# Patient Record
Sex: FEMALE | Race: WHITE | URBAN - METROPOLITAN AREA
[De-identification: names, ages, dates, MRNs, and addresses within clinical notes are randomized per-mention and may not be internally consistent; named-entity substitution may affect disease eponyms.]

---

## 2018-02-15 ENCOUNTER — HOSPITAL ENCOUNTER (OUTPATIENT)
Dept: SURGERY | Age: 43
Discharge: HOME | End: 2018-02-15
Attending: SURGERY | Admitting: SURGERY

## 2018-02-15 VITALS
RESPIRATION RATE: 14 BRPM | DIASTOLIC BLOOD PRESSURE: 70 MMHG | TEMPERATURE: 97 F | BODY MASS INDEX: 20.2 KG/M2 | OXYGEN SATURATION: 99 % | HEART RATE: 60 BPM | HEIGHT: 66 IN | WEIGHT: 125.66 LBS | SYSTOLIC BLOOD PRESSURE: 123 MMHG

## 2018-02-15 RX ORDER — EXEMESTANE 25 MG/1
25 TABLET ORAL DAILY
COMMUNITY

## 2018-02-15 ASSESSMENT — PAIN - FUNCTIONAL ASSESSMENT: PAIN_FUNCTIONAL_ASSESSMENT: 0-10

## 2018-02-15 ASSESSMENT — PAIN SCALES - GENERAL: PAINLEVEL_OUTOF10: 0

## 2018-02-15 NOTE — H&P
Date of Surgery Update:  Robert Chery was seen, history and physical examination reviewed, and patient examined by me today.  There have been no significant clinical changes since the completion of the previous history and physical.    Electronically signed by: Eugenia Soriano MD,2/15/2018,10:18 AM